# Patient Record
Sex: FEMALE | Race: WHITE | Employment: OTHER | URBAN - METROPOLITAN AREA
[De-identification: names, ages, dates, MRNs, and addresses within clinical notes are randomized per-mention and may not be internally consistent; named-entity substitution may affect disease eponyms.]

---

## 2018-05-20 ENCOUNTER — ANESTHESIA EVENT (OUTPATIENT)
Dept: SURGERY | Age: 68
End: 2018-05-20
Payer: MEDICARE

## 2018-05-21 ENCOUNTER — ANESTHESIA (OUTPATIENT)
Dept: SURGERY | Age: 68
End: 2018-05-21
Payer: MEDICARE

## 2018-05-21 ENCOUNTER — HOSPITAL ENCOUNTER (OUTPATIENT)
Age: 68
Setting detail: OUTPATIENT SURGERY
Discharge: HOME | End: 2018-05-21
Attending: OPHTHALMOLOGY | Admitting: OPHTHALMOLOGY
Payer: MEDICARE

## 2018-05-21 VITALS
OXYGEN SATURATION: 92 % | RESPIRATION RATE: 16 BRPM | WEIGHT: 185 LBS | TEMPERATURE: 97.9 F | SYSTOLIC BLOOD PRESSURE: 175 MMHG | BODY MASS INDEX: 33.84 KG/M2 | HEART RATE: 89 BPM | DIASTOLIC BLOOD PRESSURE: 89 MMHG

## 2018-05-21 LAB — GLUCOSE BLD STRIP.AUTO-MCNC: 134 MG/DL (ref 65–100)

## 2018-05-21 PROCEDURE — 74011250636 HC RX REV CODE- 250/636

## 2018-05-21 PROCEDURE — 74011000250 HC RX REV CODE- 250

## 2018-05-21 PROCEDURE — 77030003029 HC SUT VCRL J&J -B: Performed by: OPHTHALMOLOGY

## 2018-05-21 PROCEDURE — 77030022403 HC IMPL RETIN STRP DTCH -B: Performed by: OPHTHALMOLOGY

## 2018-05-21 PROCEDURE — 77030002937 HC SUT MERS J&J -B: Performed by: OPHTHALMOLOGY

## 2018-05-21 PROCEDURE — 76210000063 HC OR PH I REC FIRST 0.5 HR: Performed by: OPHTHALMOLOGY

## 2018-05-21 PROCEDURE — 77030038275 HC DEV VIEW LENS DISP OCLS -B: Performed by: OPHTHALMOLOGY

## 2018-05-21 PROCEDURE — 77030014436 HC IMPL RETIN SLV DTCH -A: Performed by: OPHTHALMOLOGY

## 2018-05-21 PROCEDURE — 77030018838 HC SOL IRR OPTH ALCN -B: Performed by: OPHTHALMOLOGY

## 2018-05-21 PROCEDURE — 77030006689 HC BLD OPHTH BVR BD -A: Performed by: OPHTHALMOLOGY

## 2018-05-21 PROCEDURE — 74011250636 HC RX REV CODE- 250/636: Performed by: ANESTHESIOLOGY

## 2018-05-21 PROCEDURE — 77030002975 HC SUT PLN J&J -B: Performed by: OPHTHALMOLOGY

## 2018-05-21 PROCEDURE — 76060000034 HC ANESTHESIA 1.5 TO 2 HR: Performed by: OPHTHALMOLOGY

## 2018-05-21 PROCEDURE — 82962 GLUCOSE BLOOD TEST: CPT

## 2018-05-21 PROCEDURE — 77030032490 HC SLV COMPR SCD KNE COVD -B: Performed by: OPHTHALMOLOGY

## 2018-05-21 PROCEDURE — 77030020143 HC AIRWY LARYN INTUB CGAS -A: Performed by: ANESTHESIOLOGY

## 2018-05-21 PROCEDURE — 76210000021 HC REC RM PH II 0.5 TO 1 HR: Performed by: OPHTHALMOLOGY

## 2018-05-21 PROCEDURE — 77030038274 HC DEV VIEW OPTIC BIOM DISP SET OCLS -B: Performed by: OPHTHALMOLOGY

## 2018-05-21 PROCEDURE — 74011000250 HC RX REV CODE- 250: Performed by: OPHTHALMOLOGY

## 2018-05-21 PROCEDURE — 77030016149 HC PRB OPHTH LSR IRID -C: Performed by: OPHTHALMOLOGY

## 2018-05-21 PROCEDURE — 77030032623 HC KT VITRCMY TOT PLS ALCN -F: Performed by: OPHTHALMOLOGY

## 2018-05-21 PROCEDURE — 77030002996 HC SUT SLK J&J -A: Performed by: OPHTHALMOLOGY

## 2018-05-21 PROCEDURE — 77030026083 HC FCPS OPHTH GRSH DSP ALCN -C: Performed by: OPHTHALMOLOGY

## 2018-05-21 PROCEDURE — 77030017621 HC NDL OPHTH1 ALCN -B: Performed by: OPHTHALMOLOGY

## 2018-05-21 PROCEDURE — 76010000162 HC OR TIME 1.5 TO 2 HR INTENSV-TIER 1: Performed by: OPHTHALMOLOGY

## 2018-05-21 PROCEDURE — 74011250636 HC RX REV CODE- 250/636: Performed by: OPHTHALMOLOGY

## 2018-05-21 DEVICE — STRIP SCLER W3.5XL125MM THK0.75MM SIL SLD STYL 41/S2970: Type: IMPLANTABLE DEVICE | Site: EYE | Status: FUNCTIONAL

## 2018-05-21 DEVICE — SLEEVE SCLER BCKL L30IN OD21IN ID1IN SIL STYL 70: Type: IMPLANTABLE DEVICE | Site: EYE | Status: FUNCTIONAL

## 2018-05-21 RX ORDER — DEXAMETHASONE SODIUM PHOSPHATE 4 MG/ML
INJECTION, SOLUTION INTRA-ARTICULAR; INTRALESIONAL; INTRAMUSCULAR; INTRAVENOUS; SOFT TISSUE AS NEEDED
Status: DISCONTINUED | OUTPATIENT
Start: 2018-05-21 | End: 2018-05-21 | Stop reason: HOSPADM

## 2018-05-21 RX ORDER — SODIUM CHLORIDE 9 MG/ML
INJECTION INTRAMUSCULAR; INTRAVENOUS; SUBCUTANEOUS AS NEEDED
Status: DISCONTINUED | OUTPATIENT
Start: 2018-05-21 | End: 2018-05-21 | Stop reason: HOSPADM

## 2018-05-21 RX ORDER — NALOXONE HYDROCHLORIDE 0.4 MG/ML
0.04 INJECTION, SOLUTION INTRAMUSCULAR; INTRAVENOUS; SUBCUTANEOUS
Status: DISCONTINUED | OUTPATIENT
Start: 2018-05-21 | End: 2018-05-21 | Stop reason: HOSPADM

## 2018-05-21 RX ORDER — GENTAMICIN SULFATE 3 MG/ML
SOLUTION/ DROPS OPHTHALMIC AS NEEDED
Status: DISCONTINUED | OUTPATIENT
Start: 2018-05-21 | End: 2018-05-21 | Stop reason: HOSPADM

## 2018-05-21 RX ORDER — LIDOCAINE HYDROCHLORIDE 10 MG/ML
0.1 INJECTION INFILTRATION; PERINEURAL AS NEEDED
Status: DISCONTINUED | OUTPATIENT
Start: 2018-05-21 | End: 2018-05-21 | Stop reason: HOSPADM

## 2018-05-21 RX ORDER — BETAMETHASONE SODIUM PHOSPHATE AND BETAMETHASONE ACETATE 3; 3 MG/ML; MG/ML
INJECTION, SUSPENSION INTRA-ARTICULAR; INTRALESIONAL; INTRAMUSCULAR; SOFT TISSUE AS NEEDED
Status: DISCONTINUED | OUTPATIENT
Start: 2018-05-21 | End: 2018-05-21 | Stop reason: HOSPADM

## 2018-05-21 RX ORDER — OXYCODONE HYDROCHLORIDE 5 MG/1
5 TABLET ORAL
Status: DISCONTINUED | OUTPATIENT
Start: 2018-05-21 | End: 2018-05-21 | Stop reason: HOSPADM

## 2018-05-21 RX ORDER — NEOMYCIN SULFATE, POLYMYXIN B SULFATE, AND DEXAMETHASONE 3.5; 10000; 1 MG/G; [USP'U]/G; MG/G
OINTMENT OPHTHALMIC AS NEEDED
Status: DISCONTINUED | OUTPATIENT
Start: 2018-05-21 | End: 2018-05-21 | Stop reason: HOSPADM

## 2018-05-21 RX ORDER — BUPIVACAINE HYDROCHLORIDE 7.5 MG/ML
INJECTION, SOLUTION EPIDURAL; RETROBULBAR AS NEEDED
Status: DISCONTINUED | OUTPATIENT
Start: 2018-05-21 | End: 2018-05-21 | Stop reason: HOSPADM

## 2018-05-21 RX ORDER — LIDOCAINE HYDROCHLORIDE 40 MG/ML
INJECTION, SOLUTION RETROBULBAR; TOPICAL AS NEEDED
Status: DISCONTINUED | OUTPATIENT
Start: 2018-05-21 | End: 2018-05-21 | Stop reason: HOSPADM

## 2018-05-21 RX ORDER — DEXTROSE 50 % IN WATER (D50W) INTRAVENOUS SYRINGE
AS NEEDED
Status: DISCONTINUED | OUTPATIENT
Start: 2018-05-21 | End: 2018-05-21 | Stop reason: HOSPADM

## 2018-05-21 RX ORDER — CIPROFLOXACIN HYDROCHLORIDE 3.5 MG/ML
1 SOLUTION/ DROPS TOPICAL
Status: DISCONTINUED | OUTPATIENT
Start: 2018-05-21 | End: 2018-05-21 | Stop reason: HOSPADM

## 2018-05-21 RX ORDER — GLYCOPYRROLATE 0.2 MG/ML
INJECTION INTRAMUSCULAR; INTRAVENOUS AS NEEDED
Status: DISCONTINUED | OUTPATIENT
Start: 2018-05-21 | End: 2018-05-21 | Stop reason: HOSPADM

## 2018-05-21 RX ORDER — LIDOCAINE HYDROCHLORIDE 20 MG/ML
INJECTION, SOLUTION EPIDURAL; INFILTRATION; INTRACAUDAL; PERINEURAL AS NEEDED
Status: DISCONTINUED | OUTPATIENT
Start: 2018-05-21 | End: 2018-05-21 | Stop reason: HOSPADM

## 2018-05-21 RX ORDER — MIDAZOLAM HYDROCHLORIDE 1 MG/ML
2 INJECTION, SOLUTION INTRAMUSCULAR; INTRAVENOUS ONCE
Status: DISCONTINUED | OUTPATIENT
Start: 2018-05-21 | End: 2018-05-21 | Stop reason: HOSPADM

## 2018-05-21 RX ORDER — ATROPINE SULFATE 10 MG/ML
SOLUTION/ DROPS OPHTHALMIC AS NEEDED
Status: DISCONTINUED | OUTPATIENT
Start: 2018-05-21 | End: 2018-05-21 | Stop reason: HOSPADM

## 2018-05-21 RX ORDER — PROPOFOL 10 MG/ML
INJECTION, EMULSION INTRAVENOUS AS NEEDED
Status: DISCONTINUED | OUTPATIENT
Start: 2018-05-21 | End: 2018-05-21 | Stop reason: HOSPADM

## 2018-05-21 RX ORDER — ONDANSETRON 2 MG/ML
INJECTION INTRAMUSCULAR; INTRAVENOUS AS NEEDED
Status: DISCONTINUED | OUTPATIENT
Start: 2018-05-21 | End: 2018-05-21 | Stop reason: HOSPADM

## 2018-05-21 RX ORDER — FENTANYL CITRATE 50 UG/ML
100 INJECTION, SOLUTION INTRAMUSCULAR; INTRAVENOUS ONCE
Status: DISCONTINUED | OUTPATIENT
Start: 2018-05-21 | End: 2018-05-21 | Stop reason: HOSPADM

## 2018-05-21 RX ORDER — PHENYLEPHRINE HYDROCHLORIDE 25 MG/ML
1 SOLUTION/ DROPS OPHTHALMIC
Status: DISCONTINUED | OUTPATIENT
Start: 2018-05-21 | End: 2018-05-21 | Stop reason: HOSPADM

## 2018-05-21 RX ORDER — SODIUM CHLORIDE, SODIUM LACTATE, POTASSIUM CHLORIDE, CALCIUM CHLORIDE 600; 310; 30; 20 MG/100ML; MG/100ML; MG/100ML; MG/100ML
100 INJECTION, SOLUTION INTRAVENOUS CONTINUOUS
Status: DISCONTINUED | OUTPATIENT
Start: 2018-05-21 | End: 2018-05-21 | Stop reason: HOSPADM

## 2018-05-21 RX ORDER — HYDROMORPHONE HYDROCHLORIDE 2 MG/ML
0.5 INJECTION, SOLUTION INTRAMUSCULAR; INTRAVENOUS; SUBCUTANEOUS
Status: DISCONTINUED | OUTPATIENT
Start: 2018-05-21 | End: 2018-05-21 | Stop reason: HOSPADM

## 2018-05-21 RX ORDER — FENTANYL CITRATE 50 UG/ML
INJECTION, SOLUTION INTRAMUSCULAR; INTRAVENOUS AS NEEDED
Status: DISCONTINUED | OUTPATIENT
Start: 2018-05-21 | End: 2018-05-21 | Stop reason: HOSPADM

## 2018-05-21 RX ORDER — CEFAZOLIN SODIUM 1 G/3ML
INJECTION, POWDER, FOR SOLUTION INTRAMUSCULAR; INTRAVENOUS AS NEEDED
Status: DISCONTINUED | OUTPATIENT
Start: 2018-05-21 | End: 2018-05-21 | Stop reason: HOSPADM

## 2018-05-21 RX ORDER — TROPICAMIDE 10 MG/ML
1 SOLUTION/ DROPS OPHTHALMIC
Status: DISCONTINUED | OUTPATIENT
Start: 2018-05-21 | End: 2018-05-21 | Stop reason: HOSPADM

## 2018-05-21 RX ORDER — MIDAZOLAM HYDROCHLORIDE 1 MG/ML
2 INJECTION, SOLUTION INTRAMUSCULAR; INTRAVENOUS
Status: DISCONTINUED | OUTPATIENT
Start: 2018-05-21 | End: 2018-05-21 | Stop reason: HOSPADM

## 2018-05-21 RX ADMIN — ONDANSETRON 4 MG: 2 INJECTION INTRAMUSCULAR; INTRAVENOUS at 08:46

## 2018-05-21 RX ADMIN — FENTANYL CITRATE 50 MCG: 50 INJECTION, SOLUTION INTRAMUSCULAR; INTRAVENOUS at 07:35

## 2018-05-21 RX ADMIN — DEXAMETHASONE SODIUM PHOSPHATE 4 MG: 4 INJECTION, SOLUTION INTRA-ARTICULAR; INTRALESIONAL; INTRAMUSCULAR; INTRAVENOUS; SOFT TISSUE at 08:54

## 2018-05-21 RX ADMIN — PHENYLEPHRINE HYDROCHLORIDE 1 DROP: 25 SOLUTION/ DROPS OPHTHALMIC at 06:43

## 2018-05-21 RX ADMIN — PROPOFOL 200 MG: 10 INJECTION, EMULSION INTRAVENOUS at 07:22

## 2018-05-21 RX ADMIN — TROPICAMIDE 1 DROP: 10 SOLUTION/ DROPS OPHTHALMIC at 06:45

## 2018-05-21 RX ADMIN — TROPICAMIDE 1 DROP: 10 SOLUTION/ DROPS OPHTHALMIC at 06:56

## 2018-05-21 RX ADMIN — GLYCOPYRROLATE 0.2 MG: 0.2 INJECTION INTRAMUSCULAR; INTRAVENOUS at 07:45

## 2018-05-21 RX ADMIN — SODIUM CHLORIDE, SODIUM LACTATE, POTASSIUM CHLORIDE, AND CALCIUM CHLORIDE 100 ML/HR: 600; 310; 30; 20 INJECTION, SOLUTION INTRAVENOUS at 06:19

## 2018-05-21 RX ADMIN — FENTANYL CITRATE 50 MCG: 50 INJECTION, SOLUTION INTRAMUSCULAR; INTRAVENOUS at 07:22

## 2018-05-21 RX ADMIN — PHENYLEPHRINE HYDROCHLORIDE 1 DROP: 25 SOLUTION/ DROPS OPHTHALMIC at 06:18

## 2018-05-21 RX ADMIN — LIDOCAINE HYDROCHLORIDE 100 MG: 20 INJECTION, SOLUTION EPIDURAL; INFILTRATION; INTRACAUDAL; PERINEURAL at 07:22

## 2018-05-21 RX ADMIN — PHENYLEPHRINE HYDROCHLORIDE 1 DROP: 25 SOLUTION/ DROPS OPHTHALMIC at 06:56

## 2018-05-21 RX ADMIN — TROPICAMIDE 1 DROP: 10 SOLUTION/ DROPS OPHTHALMIC at 06:18

## 2018-05-21 NOTE — ANESTHESIA PREPROCEDURE EVALUATION
Anesthetic History   No history of anesthetic complications            Review of Systems / Medical History  Pertinent labs reviewed    Pulmonary  Within defined limits                 Neuro/Psych   Within defined limits           Cardiovascular    Hypertension              Exercise tolerance: >4 METS     GI/Hepatic/Renal  Within defined limits              Endo/Other    Diabetes: well controlled, type 2    Obesity     Other Findings            Physical Exam    Airway  Mallampati: III  TM Distance: 4 - 6 cm  Neck ROM: normal range of motion   Mouth opening: Normal     Cardiovascular  Regular rate and rhythm,  S1 and S2 normal,  no murmur, click, rub, or gallop             Dental  No notable dental hx       Pulmonary  Breath sounds clear to auscultation               Abdominal  GI exam deferred       Other Findings            Anesthetic Plan    ASA: 2  Anesthesia type: general          Induction: Intravenous  Anesthetic plan and risks discussed with: Patient

## 2018-05-21 NOTE — OP NOTES
[facility] REPORT    Tommy Garcia  MR#: [id number]  : 1950  ACCOUNT #: [de-identified]   DATE OF SERVICE: 2018    PREOPERATIVE DIAGNOSIS:  Retinal detachment, left eye. POSTOPERATIVE DIAGNOSES   1. Retinal detachment, left eye.  2.  C1 proliferative vitreoretinopathy. 3.  Cataract. PROCEDURES PERFORMED   1. Pars plana vitrectomy, repair of retinal detachment, left eye.  2.  Scleral buckle, left eye. 3.  Scar tissue removal, left eye.  4.  12% SF6 gas-fluid exchange, left eye. 5.  Laser photocoagulation. SURGEON:  Krupa Mcallister MD     ASSISTANT:  none      ANESTHESIA: general LMA    ESTIMATED BLOOD LOSS:  0 cc    SPECIMENS REMOVED:  none     COMPLICATIONS:  none    IMPLANTS: 41 Band, 70 sleeve    PROCEDURE IN DETAIL:  Patient was brought to the operating room, placed in supine position. She was given general anesthesia per LMA. The face was then prepped and draped in usual fashion. Lid speculum was put in place in the left eye. A 360-degree peritomy was performed with relaxing incisions at the 3 and 9 o'clock positions. Muscles were isolated and tagged with silk suture for traction. Fundus was inspected with scleral depression of the periphery. There was a single retinal tear inferotemporally with an adjacent area of starfold. The retina was nearly completely attached except for small area inferonasally. A piece of 41 band was placed under preplaced mattress sutures and joined inferonasally with a 70 sleeve. The buckle was positioned to support the retinal defect in the infratemporal quadrant. The eye was then prepared for vitrectomy. 25-gauge cannulas were placed 4 mm back from the limbus. The infusion cannula was placed in the infratemporal quadrant. Prior to turning on the infusion fluids, the tip of the cannula was visualized in the vitreous space.   Additional 25-gauge cannula was placed in superotemporal nasal quadrants, again 4 mm back from the limbus. A core vitrectomy was performed using the light pipe and illumination, vitreous cutter. There was a posterior vitreous separation. The vitreous was then trimmed back to the base 360 degrees. The starfold in the inferotemporal quadrant was relaxed with endgripping forceps. A drain for subretinal fluid was made out in the superotemporal quadrant. Subretinal fluid was first drained under fluid, then air. The subretinal fluid was drained through both the retinal break and the retinotomy. There was minimal residual subretinal fluid left after this procedure. Laser photocoagulation was placed around the retinal break and the retinotomy site. The eye was then irrigated with 30 mL of a 12% mixture of SF6 gas drawn up through the double millipore filter. The intraocular pressure was adjusted to 15 by tonometry. The sclerotomy sites were closed with 8-0 Vicryl suture. The bridle sutures were removed, the conjunctiva was then closed with 6-0 plain interrupted sutures. The retrobulbar space was irrigated with 3 mL of 50/50 mixture of 0.75% Marcaine and 4% Xylocaine for postoperative pain control. The subconjunctival space injected with 0.5 mL of betamethasone, 0.5 mL of Ancef. It was necessary to remove the corneal epithelium midway during the procedure to allow better visualization. The eye was given a drop each of atropine, ofloxacin, and prednisolone 1%. The eye was closed, patched and shielded.       MD TOÑO Amado / LILIANE  D: 05/21/2018 18:30     T: 05/21/2018 19:54  JOB #: [id number]

## 2018-05-21 NOTE — PERIOP NOTES
Pt discharged home with rx x1 (Norco), eye drops, wallet card, wound care instructions and follow up appointment. Verbal understanding of all instructions by patient and friend (Shara). All questions answered prior to discharge. All belongings taken with pt. Pt taken to restroom via wheelchair by this RN and then to car.

## 2018-05-21 NOTE — IP AVS SNAPSHOT
[facility] 
 
 
 [address] 
[phone] Patient: Duc Farnsworth MRN: [id number] ZPE:6[birth year]32 About your hospitalization You were admitted on:  May 21, 2018 You last received care in the:  [facility] OP PACU You were discharged on:  May 21, 2018 Why you were hospitalized Your primary diagnosis was:  Not on File Follow-up Information Follow up With Details Comments Contact Info Michell Ferro MD  Tomorrow, May 22nd at 9:40am at the [facility] office with Dr. Claudean Player [address] [facility] [place] [address] 
[phone] Discharge Orders None A check yumiko indicates which time of day the medication should be taken. My Medications ASK your doctor about these medications Instructions Each Dose to Equal  
 Morning Noon Evening Bedtime  
 atorvastatin 40 mg tablet Commonly known as:  LIPITOR Your last dose was: Your next dose is: Take 40 mg by mouth nightly. 40 mg  
    
   
   
   
  
 BIOTIN PO Your last dose was: Your next dose is: Take 2 Tabs by mouth daily. 2 Tab  
    
   
   
   
  
 COQ10 (UBIQUINOL) PO Your last dose was: Your next dose is: Take 400 mg by mouth daily. 400 mg  
    
   
   
   
  
 glipiZIDE 10 mg tablet Commonly known as:  Bolivar Picking Your last dose was: Your next dose is: Take 10 mg by mouth daily. 10 mg  
    
   
   
   
  
 losartan 100 mg tablet Commonly known as:  COZAAR Your last dose was: Your next dose is: Take 100 mg by mouth daily. 100 mg  
    
   
   
   
  
 metFORMIN 500 mg tablet Commonly known as:  GLUCOPHAGE Your last dose was: Your next dose is: Take 2,000 mg by mouth nightly. Indications: type 2 diabetes mellitus  2000 mg  
    
   
 OTHER Your last dose was: Your next dose is: Take  by mouth two (2) times a day. Xiao Synergy- supports healthy digestion VITAMIN D3 2,000 unit Tab Generic drug:  cholecalciferol (vitamin D3) Your last dose was: Your next dose is: Take  by mouth daily. Discharge Instructions [facility] [place], [place] MD Cassidy Holguin MD Clement Gable. MD Melody Rob. Keshia Brooks, [address] or [phone] or [phone] or [phone] Post Operative Instructions for Retina and Vitreous Surgery Patients The following are a few guidelines that you should observe for two weeks after surgery: 1. Avoid stooping over, lifting heavy weights or bumping your head. 2.  Special positioning: Face down for 45 minutes of every hour while awake. Sleep on right side with nose to mattress. 3.  No extensive traveling except what is necessary. If a gas air bubble was put in your      eye at surgery - avoid air travel until you consult your doctor. 4.  You may shave after the third day. 5.  You may watch television, read, cook and wash dishes as long as it does not interfere 
      with special positioning instructions. 6.  Alcoholic beverages may be used in moderation. 7.  No sexual intercourse. 8.  You  may bathe the eyelids daily with cotton or a tissue moistened with warm tap 
     water. Do not bathe the eyeball itself. 9.  Some discharge from the operated eye is to be expected. This should gradually  
     improve. 10. Change the eye pad at least once daily. You may discontinue the eye pad whenever 
      comfortable to do so (usually three days after the operation). Wear the eye shield or 
      glasses at all times. 11. If you experience increasing pain, decreasing vision, or pus like discharge, call the 
      Office. 12. Medication Instructions: 
       Prednisolone 1% - one drop in the operated eye ___4___ times a day. Atropine 1% - one drop in the operated eye at bedtime. Maxitrol Ointment - apply in operated eye 4 times a day. 
       ___Ofloxacin___(antibiotic drop) - one drop in operated eye 4 times a day. BRING ALL EYE DROPS TO YOU POSTOPERATIVE APPOINTMENT! 13. Return appointment at the [facility] On May 22nd (Tomorrow) at 9:40am. You will be seeing Dr. Tereza Huang DISCHARGE SUMMARY from Nurse PATIENT INSTRUCTIONS: 
 
After general anesthesia or intravenous sedation, for 24 hours or while taking prescription Narcotics: · Limit your activities · Do not drive and operate hazardous machinery · Do not make important personal or business decisions · Do  not drink alcoholic beverages · If you have not urinated within 8 hours after discharge, please contact your surgeon on call. *  Please give a list of your current medications to your Primary Care Provider. *  Please update this list whenever your medications are discontinued, doses are 
    changed, or new medications (including over-the-counter products) are added. *  Please carry medication information at all times in case of emergency situations. These are general instructions for a healthy lifestyle: No smoking/ No tobacco products/ Avoid exposure to second hand smoke Surgeon General's Warning:  Quitting smoking now greatly reduces serious risk to your health. Obesity, smoking, and sedentary lifestyle greatly increases your risk for illness A healthy diet, regular physical exercise & weight monitoring are important for maintaining a healthy lifestyle You may be retaining fluid if you have a history of heart failure or if you experience any of the following symptoms:  Weight gain of 3 pounds or more overnight or 5 pounds in a week, increased swelling in our hands or feet or shortness of breath while lying flat in bed. Please call your doctor as soon as you notice any of these symptoms; do not wait until your next office visit. Recognize signs and symptoms of STROKE: 
 
F-face looks uneven A-arms unable to move or move unevenly S-speech slurred or non-existent T-time-call 911 as soon as signs and symptoms begin-DO NOT go Back to bed or wait to see if you get better-TIME IS BRAIN. Introducing [facility]! Dear Rosi Johnson: Thank you for requesting a [employer] account. Our records indicate that you already have an active [employer] account. You can access your account anytime at [email or web address] Did you know that you can access your hospital and ER discharge instructions at any time in [employer]? You can also review all of your test results from your hospital stay or ER visit. Additional Information If you have questions, please visit the Frequently Asked Questions section of the [employer] website at [email or web address] Remember, [employer] is NOT to be used for urgent needs. For medical emergencies, dial 911. Now available from your iPhone and Android! Introducing Lew Amos As a [facility] patient, I wanted to make you aware of our electronic visit tool called [employer]. [facility] 24/7 allows you to connect within minutes with a medical provider 24 hours a day, seven days a week via a mobile device or tablet or logging into a secure website from your computer. You can access Lew Amos from anywhere in the [place].  
 
A virtual visit might be right for you when you have a simple condition and feel like you just dont want to get out of bed, or cant get away from work for an appointment, when your regular [facility] provider is not available (evenings, weekends or holidays), or when youre out of town and need minor care. Electronic visits cost only $49 and if the [employer] 24/7 provider determines a prescription is needed to treat your condition, one can be electronically transmitted to a nearby pharmacy*. Please take a moment to enroll today if you have not already done so. The enrollment process is free and takes just a few minutes. To enroll, please download the [employer] tierney to your tablet or phone, or visit [email or web address] to enroll on your computer. And, as an [address] [place] [address] patient with a [employer] account, the results of your visits will be scanned into your electronic medical record and your primary care provider will be able to view the scanned results. We urge you to continue to see your regular [employer] provider for your ongoing medical care. And while your primary care provider may not be the one available when you seek a [employer] virtual visit, the peace of mind you get from getting a real diagnosis real time can be priceless. For more information on [employer], view our Frequently Asked Questions (FAQs) at [email or web address]. Sincerely, 
 
Karel Cruz MD 
Chief Medical Officer [address] [place] [address] *:  certain medications cannot be prescribed via [employer] Providers Seen During Your Hospitalization Provider Specialty Primary office phone Carroll Valentin MD Ophthalmology [phone] Your Primary Care Physician (PCP) Primary Care Physician Office Phone Office Fax NOT ON FILE ** None ** ** None ** You are allergic to the following No active allergies Recent Documentation Weight BMI Smoking Status 83.9 kg 33.84 kg/m2 Former Smoker Emergency Contacts Name Discharge Info Relation Home Work Mobile Jenaro Flores [24] [phone] Radha Asif DISCHARGE CAREGIVER [3] Friend [5] [phone] Patient Belongings The following personal items are in your possession at time of discharge: 
  Dental Appliances: None         Home Medications: None   Jewelry: None  Clothing: Footwear, Pants, Shirt    Other Valuables: None Please provide this summary of care documentation to your next provider. Signatures-by signing, you are acknowledging that this After Visit Summary has been reviewed with you and you have received a copy. Patient Signature:  ____________________________________________________________ Date:  ____________________________________________________________  
  
[facility] Provider Signature:  ____________________________________________________________ Date:  ____________________________________________________________

## 2018-05-21 NOTE — ANESTHESIA POSTPROCEDURE EVALUATION
Post-Anesthesia Evaluation and Assessment    Patient: Abhilash Lange MRN: [id number]  SSN: [id number]    YOB: 1950  Age: 79 y.o. Sex: female       Cardiovascular Function/Vital Signs  Visit Vitals    /89 (BP 1 Location: Left arm, BP Patient Position: Sitting)    Pulse 89    Temp 36.6 °C (97.9 °F)    Resp 16    Wt 83.9 kg (185 lb)    SpO2 92%    BMI 33.84 kg/m2       Patient is status post general anesthesia for Procedure(s):  LEFT VITRECTOMY POSTERIOR 25 GAUGE/LASER /SCAR TISSUE REMOVAL  LEFT EYE GAS FLUID EXCHANGE-SF6  LEFT SCLERAL BUCKLE. Nausea/Vomiting: None    Postoperative hydration reviewed and adequate. Pain:  Pain Scale 1: Numeric (0 - 10) (05/21/18 0332)  Pain Intensity 1: 2 (05/21/18 3922)   Managed    Neurological Status:   Neuro (WDL): Within Defined Limits (05/21/18 2172)  Neuro  Neurologic State: Drowsy (05/21/18 0913)   At baseline    Mental Status and Level of Consciousness: Awake. Pulmonary Status:   O2 Device: Room air (05/21/18 6988)   Adequate oxygenation and airway patent    Complications related to anesthesia: None    Post-anesthesia assessment completed.  No concerns    Signed By: Coco Garland MD     May 21, 2018

## 2018-05-21 NOTE — BRIEF OP NOTE
BRIEF OPERATIVE NOTE    Date of Procedure: 5/21/2018   Preoperative Diagnosis: Retinal detachment with multiple retinal tears, left [H33.022]  Postoperative Diagnosis: Retinal detachment with  retinal tear/ proliferative vitreoretinopathy stage C1 / left    Procedure(s):   VITRECTOMY POSTERIOR 25 GAUGE/LASER Vic Holiday TISSUE REMOVAL  / 12% SF6 GAS FLUID EXCHANGE LEFT EYE   LEFT SCLERAL BUCKLE  Surgeon(s) and Role:     * Briana Silveira MD - Primary         Surgical Assistant: none    Surgical Staff:  Circ-1: Lynn Crowley RN  Scrub Tech-1: Ofe Hoffman  Event Time In   Incision Start 0732   Incision Close 0903     Anesthesia: General   Estimated Blood Loss: 0 cc  Specimens: * No specimens in log *   Findings: Retinal detachment with  retinal tear/ proliferative vitreoretinopathy stage C1 / left      Complications: none  Implants:   Implant Name Type Inv. Item Serial No.  Lot No. LRB No. Used Action   IMPL RETIN SLV RND 1X2.1X30MM --  - [id number]  IMPL RETIN SLV RND 1X2.1X30MM --   [facility] [id number] Left 1 Implanted   IMPL RETIN STRP 0.75X3. [id number] --  - [id number]   IMPL RETIN STRP 0.75X3. [id number] --    [facility] [id number] Left 1 Implanted

## 2018-05-21 NOTE — DISCHARGE INSTRUCTIONS
[facility], [address] [facility] MD Nicolette Maria MD Patra Cree. MD Presley Collins. Gonzalez Gomez MD    [phone] or [phone] or [phone] or [phone]    Post Operative Instructions for Retina and Vitreous Surgery Patients    The following are a few guidelines that you should observe for two weeks after surgery:    1. Avoid stooping over, lifting heavy weights or bumping your head. 2.  Special positioning: Face down for 45 minutes of every hour while awake. Sleep on right side with nose to mattress. 3.  No extensive traveling except what is necessary. If a gas air bubble was put in your      eye at surgery - avoid air travel until you consult your doctor. 4.  You may shave after the third day. 5.  You may watch television, read, cook and wash dishes as long as it does not interfere        with special positioning instructions. 6.  Alcoholic beverages may be used in moderation. 7.  No sexual intercourse. 8.  You  may bathe the eyelids daily with cotton or a tissue moistened with warm tap       water. Do not bathe the eyeball itself. 9.  Some discharge from the operated eye is to be expected. This should gradually        improve. 10. Change the eye pad at least once daily. You may discontinue the eye pad whenever        comfortable to do so (usually three days after the operation). Wear the eye shield or        glasses at all times. 11. If you experience increasing pain, decreasing vision, or pus like discharge, call the        Office. 12. Medication Instructions:         Prednisolone 1% - one drop in the operated eye ___4___ times a day. Atropine 1% - one drop in the operated eye at bedtime.          Maxitrol Ointment - apply in operated eye 4 times a day.         ___Ofloxacin___(antibiotic drop) - one drop in operated eye 4 times a day.         BRING ALL EYE DROPS TO YOU POSTOPERATIVE APPOINTMENT! 15. Return appointment at the [facility]        On May 22nd (Tomorrow) at 9:40am. You will be seeing Dr. Renee Escobedo from Nurse    PATIENT INSTRUCTIONS:    After general anesthesia or intravenous sedation, for 24 hours or while taking prescription Narcotics:  · Limit your activities  · Do not drive and operate hazardous machinery  · Do not make important personal or business decisions  · Do  not drink alcoholic beverages  · If you have not urinated within 8 hours after discharge, please contact your surgeon on call. *  Please give a list of your current medications to your Primary Care Provider. *  Please update this list whenever your medications are discontinued, doses are      changed, or new medications (including over-the-counter products) are added. *  Please carry medication information at all times in case of emergency situations. These are general instructions for a healthy lifestyle:    No smoking/ No tobacco products/ Avoid exposure to second hand smoke    Surgeon General's Warning:  Quitting smoking now greatly reduces serious risk to your health. Obesity, smoking, and sedentary lifestyle greatly increases your risk for illness    A healthy diet, regular physical exercise & weight monitoring are important for maintaining a healthy lifestyle    You may be retaining fluid if you have a history of heart failure or if you experience any of the following symptoms:  Weight gain of 3 pounds or more overnight or 5 pounds in a week, increased swelling in our hands or feet or shortness of breath while lying flat in bed. Please call your doctor as soon as you notice any of these symptoms; do not wait until your next office visit.     Recognize signs and symptoms of STROKE:    F-face looks uneven    A-arms unable to move or move unevenly    S-speech slurred or non-existent    T-time-call 911 as soon as signs and symptoms begin-DO NOT go       Back to bed or wait to see if you get better-TIME IS BRAIN.

## 2025-05-14 ENCOUNTER — APPOINTMENT (OUTPATIENT)
Dept: URBAN - METROPOLITAN AREA CLINIC 206 | Age: 75
Setting detail: DERMATOLOGY
End: 2025-05-14

## 2025-05-14 DIAGNOSIS — L57.0 ACTINIC KERATOSIS: ICD-10-CM

## 2025-05-14 DIAGNOSIS — T07XXXA ABRASION OR FRICTION BURN OF OTHER, MULTIPLE, AND UNSPECIFIED SITES, WITHOUT MENTION OF INFECTION: ICD-10-CM

## 2025-05-14 DIAGNOSIS — L82.0 INFLAMED SEBORRHEIC KERATOSIS: ICD-10-CM

## 2025-05-14 PROBLEM — S80.922A UNSPECIFIED SUPERFICIAL INJURY OF LEFT LOWER LEG, INITIAL ENCOUNTER: Status: ACTIVE | Noted: 2025-05-14

## 2025-05-14 PROCEDURE — OTHER OTHER: OTHER

## 2025-05-14 PROCEDURE — OTHER ADDITIONAL NOTES: OTHER

## 2025-05-14 PROCEDURE — 99202 OFFICE O/P NEW SF 15 MIN: CPT | Mod: 25

## 2025-05-14 PROCEDURE — OTHER MIPS QUALITY: OTHER

## 2025-05-14 PROCEDURE — OTHER COUNSELING: OTHER

## 2025-05-14 PROCEDURE — OTHER LIQUID NITROGEN: OTHER

## 2025-05-14 PROCEDURE — 17003 DESTRUCT PREMALG LES 2-14: CPT | Mod: 59

## 2025-05-14 PROCEDURE — 17110 DESTRUCT B9 LESION 1-14: CPT

## 2025-05-14 PROCEDURE — 17000 DESTRUCT PREMALG LESION: CPT | Mod: 59

## 2025-05-14 ASSESSMENT — LOCATION ZONE DERM
LOCATION ZONE: FINGER
LOCATION ZONE: HAND
LOCATION ZONE: LEG
LOCATION ZONE: ARM

## 2025-05-14 ASSESSMENT — LOCATION SIMPLE DESCRIPTION DERM
LOCATION SIMPLE: LEFT UPPER ARM
LOCATION SIMPLE: LEFT RING FINGER
LOCATION SIMPLE: RIGHT HAND
LOCATION SIMPLE: LEFT MIDDLE FINGER
LOCATION SIMPLE: LEFT PRETIBIAL REGION
LOCATION SIMPLE: LEFT FOREARM

## 2025-05-14 ASSESSMENT — LOCATION DETAILED DESCRIPTION DERM
LOCATION DETAILED: LEFT ANTERIOR PROXIMAL UPPER ARM
LOCATION DETAILED: LEFT PROXIMAL DORSAL MIDDLE FINGER
LOCATION DETAILED: LEFT DISTAL PRETIBIAL REGION
LOCATION DETAILED: RIGHT DORSAL MIDDLE FINGER METACARPOPHALANGEAL JOINT
LOCATION DETAILED: LEFT PROXIMAL RADIAL DORSAL FOREARM
LOCATION DETAILED: RIGHT DORSAL RING FINGER METACARPOPHALANGEAL JOINT
LOCATION DETAILED: LEFT PROXIMAL RADIAL DORSAL RING FINGER

## 2025-06-18 ENCOUNTER — APPOINTMENT (OUTPATIENT)
Dept: URBAN - METROPOLITAN AREA CLINIC 206 | Age: 75
Setting detail: DERMATOLOGY
End: 2025-06-18

## 2025-06-18 DIAGNOSIS — L82.0 INFLAMED SEBORRHEIC KERATOSIS: ICD-10-CM

## 2025-06-18 DIAGNOSIS — L57.8 OTHER SKIN CHANGES DUE TO CHRONIC EXPOSURE TO NONIONIZING RADIATION: ICD-10-CM

## 2025-06-18 DIAGNOSIS — D18.0 HEMANGIOMA: ICD-10-CM

## 2025-06-18 DIAGNOSIS — L57.0 ACTINIC KERATOSIS: ICD-10-CM

## 2025-06-18 DIAGNOSIS — Z80.8 FAMILY HISTORY OF MALIGNANT NEOPLASM OF OTHER ORGANS OR SYSTEMS: ICD-10-CM

## 2025-06-18 DIAGNOSIS — Z85.820 PERSONAL HISTORY OF MALIGNANT MELANOMA OF SKIN: ICD-10-CM

## 2025-06-18 DIAGNOSIS — L82.1 OTHER SEBORRHEIC KERATOSIS: ICD-10-CM

## 2025-06-18 DIAGNOSIS — D22 MELANOCYTIC NEVI: ICD-10-CM

## 2025-06-18 DIAGNOSIS — Z71.89 OTHER SPECIFIED COUNSELING: ICD-10-CM

## 2025-06-18 PROBLEM — D18.01 HEMANGIOMA OF SKIN AND SUBCUTANEOUS TISSUE: Status: ACTIVE | Noted: 2025-06-18

## 2025-06-18 PROBLEM — D22.9 MELANOCYTIC NEVI, UNSPECIFIED: Status: ACTIVE | Noted: 2025-06-18

## 2025-06-18 PROCEDURE — OTHER COUNSELING: OTHER

## 2025-06-18 PROCEDURE — 17004 DESTROY PREMAL LESIONS 15/>: CPT

## 2025-06-18 PROCEDURE — OTHER SUNSCREEN RECOMMENDATIONS: OTHER

## 2025-06-18 PROCEDURE — OTHER LIQUID NITROGEN: OTHER

## 2025-06-18 PROCEDURE — 99213 OFFICE O/P EST LOW 20 MIN: CPT | Mod: 25

## 2025-06-18 PROCEDURE — 17110 DESTRUCT B9 LESION 1-14: CPT | Mod: 59

## 2025-06-18 PROCEDURE — OTHER MIPS QUALITY: OTHER

## 2025-06-18 ASSESSMENT — LOCATION DETAILED DESCRIPTION DERM
LOCATION DETAILED: LEFT PROXIMAL DORSAL FOREARM
LOCATION DETAILED: LEFT LOWER CUTANEOUS LIP
LOCATION DETAILED: LEFT PROXIMAL CALF
LOCATION DETAILED: RIGHT PROXIMAL PRETIBIAL REGION
LOCATION DETAILED: LEFT PROXIMAL POSTERIOR UPPER ARM
LOCATION DETAILED: RIGHT LATERAL DISTAL PRETIBIAL REGION
LOCATION DETAILED: RIGHT MID-UPPER BACK
LOCATION DETAILED: LEFT PROXIMAL PRETIBIAL REGION
LOCATION DETAILED: RIGHT UPPER CUTANEOUS LIP
LOCATION DETAILED: RIGHT DORSAL FOOT
LOCATION DETAILED: NASAL DORSUM
LOCATION DETAILED: LEFT DORSAL RING FINGER METACARPOPHALANGEAL JOINT
LOCATION DETAILED: LEFT UPPER CUTANEOUS LIP
LOCATION DETAILED: 3RD WEB SPACE RIGHT HAND
LOCATION DETAILED: MIDDLE STERNUM
LOCATION DETAILED: LEFT LATERAL DORSAL WRIST
LOCATION DETAILED: LEFT ELBOW
LOCATION DETAILED: RIGHT RADIAL DORSAL HAND
LOCATION DETAILED: RIGHT DORSAL MIDDLE FINGER METACARPOPHALANGEAL JOINT
LOCATION DETAILED: LEFT DISTAL PRETIBIAL REGION
LOCATION DETAILED: LEFT DORSAL WRIST
LOCATION DETAILED: RIGHT DISTAL PRETIBIAL REGION
LOCATION DETAILED: LEFT DORSAL MIDDLE FINGER METACARPOPHALANGEAL JOINT
LOCATION DETAILED: RIGHT PROXIMAL CALF
LOCATION DETAILED: RIGHT ULNAR DORSAL HAND
LOCATION DETAILED: LEFT LATERAL PROXIMAL PRETIBIAL REGION
LOCATION DETAILED: LEFT ANTERIOR PROXIMAL UPPER ARM
LOCATION DETAILED: RIGHT PROXIMAL DORSAL INDEX FINGER
LOCATION DETAILED: RIGHT POSTERIOR SHOULDER
LOCATION DETAILED: RIGHT PROXIMAL DORSAL THUMB
LOCATION DETAILED: LEFT ANTERIOR DISTAL UPPER ARM
LOCATION DETAILED: RIGHT DISTAL DORSAL FOREARM
LOCATION DETAILED: LEFT LATERAL SUPERIOR CHEST

## 2025-06-18 ASSESSMENT — LOCATION SIMPLE DESCRIPTION DERM
LOCATION SIMPLE: CHEST
LOCATION SIMPLE: RIGHT LIP
LOCATION SIMPLE: LEFT PRETIBIAL REGION
LOCATION SIMPLE: LEFT LIP
LOCATION SIMPLE: RIGHT THUMB
LOCATION SIMPLE: LEFT WRIST
LOCATION SIMPLE: RIGHT HAND
LOCATION SIMPLE: RIGHT CALF
LOCATION SIMPLE: RIGHT SHOULDER
LOCATION SIMPLE: RIGHT UPPER BACK
LOCATION SIMPLE: RIGHT PRETIBIAL REGION
LOCATION SIMPLE: LEFT HAND
LOCATION SIMPLE: LEFT CALF
LOCATION SIMPLE: NOSE
LOCATION SIMPLE: LEFT UPPER ARM
LOCATION SIMPLE: RIGHT FOOT
LOCATION SIMPLE: LEFT POSTERIOR UPPER ARM
LOCATION SIMPLE: RIGHT INDEX FINGER
LOCATION SIMPLE: LEFT ELBOW
LOCATION SIMPLE: RIGHT FOREARM
LOCATION SIMPLE: LEFT FOREARM

## 2025-06-18 ASSESSMENT — LOCATION ZONE DERM
LOCATION ZONE: FINGER
LOCATION ZONE: HAND
LOCATION ZONE: FEET
LOCATION ZONE: TRUNK
LOCATION ZONE: LIP
LOCATION ZONE: NOSE
LOCATION ZONE: ARM
LOCATION ZONE: LEG

## (undated) DEVICE — SYR 50ML LR LCK 1ML GRAD NSAF --

## (undated) DEVICE — SUTURE ABSORBABLE MONOFILAMENT 6-0 G-1 18 IN PLN GUT 770G

## (undated) DEVICE — BIOM OPTIC SET DISPOSABLE, WITH BIOM HD PROFESSIONAL LENS FOR F=175 MM: Brand: BIOM OPTIC SET DISPOSABLE, WITH BIOM HD PROFESSIONAL LENS FOR F=175 MM

## (undated) DEVICE — SUTURE MERSLEN 5-0 18IN RD-1 746G

## (undated) DEVICE — ADVANCED DSP BACKFLUSH BLUNT, 25G: Brand: ALCON GRIESHABER

## (undated) DEVICE — 1060 S-DRAPE SPCL INCISE 10/BX 4BX/CS: Brand: STERI-DRAPE™

## (undated) DEVICE — SUT VCRL 8-0 12IN TG1408 VIO --

## (undated) DEVICE — 25+® REVOLUTION DSP ILM FORCEPS: Brand: ALCON GRIESHABER REVOLUTION 25+

## (undated) DEVICE — SYR 10ML LUER LOK 1/5ML GRAD --

## (undated) DEVICE — BLADE OPHTH MINI BEAV SHRP --

## (undated) DEVICE — PAD,EYE,1-5/8X2 5/8,STERILE,LF,1/PK: Brand: MEDLINE

## (undated) DEVICE — LENS VITRCTMY OCU FLAT DISP

## (undated) DEVICE — Z DISCONTINUED NO SUB IDED SHIELD EYE PLAS RT BGE M 7.5CMX5.7CM VISITEC

## (undated) DEVICE — CARDINAL HEALTH FLEXIBLE LIGHT HANDLE COVER: Brand: CARDINAL HEALTH

## (undated) DEVICE — SOLUTION IRRIGATION BAL SALT SOLUTION 500 ML BTL 6/CA BSS +

## (undated) DEVICE — Device: Brand: 25G STRAIGHT ENDOPROBE® BOX OF 6

## (undated) DEVICE — CONSTELLATION VISION SYSTEM 5000 CPM ULTRAVIT PROBE STRAIGHT ENDOILLUMINATOR 25+ TOTALPLUS VITRECTOMY PAK EDGEPLUS BLADE VALVED ENTRY SYSTEM: Brand: CONSTELLATION, ULTRAVIT, 25+, TOTALPLUS, EDGEPLUS

## (undated) DEVICE — KENDALL SCD EXPRESS SLEEVES, KNEE LENGTH, MEDIUM: Brand: KENDALL SCD

## (undated) DEVICE — Device

## (undated) DEVICE — (D)STRIP SKN CLSR 0.5X4IN WHT --

## (undated) DEVICE — EYE SPEAR / FINE DISSECTOR: Brand: DEROYAL

## (undated) DEVICE — SURGICAL PROCEDURE PACK EYE CDS

## (undated) DEVICE — Device: Brand: MILLEX-OR

## (undated) DEVICE — SUTURE PERMAHAND SZ 3-0 L18IN NONABSORBABLE BLK SILK BRAID A184H

## (undated) DEVICE — 1010 S-DRAPE TOWEL DRAPE 10/BX: Brand: STERI-DRAPE™

## (undated) DEVICE — COVER,MAYO STAND,STERILE: Brand: MEDLINE